# Patient Record
Sex: FEMALE | Race: WHITE | NOT HISPANIC OR LATINO | URBAN - METROPOLITAN AREA
[De-identification: names, ages, dates, MRNs, and addresses within clinical notes are randomized per-mention and may not be internally consistent; named-entity substitution may affect disease eponyms.]

---

## 2017-08-16 RX ORDER — FLUOXETINE HYDROCHLORIDE 20 MG/1
20 CAPSULE ORAL DAILY
Qty: 30 CAPSULE | Refills: 0 | Status: SHIPPED | OUTPATIENT
Start: 2017-08-16 | End: 2017-08-28

## 2017-08-28 ENCOUNTER — OFFICE VISIT (OUTPATIENT)
Dept: OBSTETRICS AND GYNECOLOGY | Age: 22
End: 2017-08-28

## 2017-08-28 VITALS
SYSTOLIC BLOOD PRESSURE: 108 MMHG | HEIGHT: 67 IN | BODY MASS INDEX: 40.81 KG/M2 | DIASTOLIC BLOOD PRESSURE: 74 MMHG | WEIGHT: 260 LBS

## 2017-08-28 DIAGNOSIS — Z12.4 ROUTINE CERVICAL SMEAR: ICD-10-CM

## 2017-08-28 DIAGNOSIS — Z01.419 ENCOUNTER FOR GYNECOLOGICAL EXAMINATION WITHOUT ABNORMAL FINDING: Primary | ICD-10-CM

## 2017-08-28 DIAGNOSIS — F41.9 ANXIETY: ICD-10-CM

## 2017-08-28 DIAGNOSIS — Z11.3 SCREEN FOR SEXUALLY TRANSMITTED DISEASES: ICD-10-CM

## 2017-08-28 DIAGNOSIS — E66.01 MORBID OBESITY DUE TO EXCESS CALORIES (HCC): ICD-10-CM

## 2017-08-28 PROBLEM — E66.9 OBESE: Status: ACTIVE | Noted: 2017-08-28

## 2017-08-28 PROCEDURE — 99395 PREV VISIT EST AGE 18-39: CPT | Performed by: OBSTETRICS & GYNECOLOGY

## 2017-08-28 RX ORDER — CITALOPRAM 20 MG/1
20 TABLET ORAL DAILY
Qty: 30 TABLET | Refills: 11 | Status: SHIPPED | OUTPATIENT
Start: 2017-08-28 | End: 2018-08-28

## 2017-08-28 RX ORDER — NORGESTREL AND ETHINYL ESTRADIOL 0.3-0.03MG
1 KIT ORAL DAILY
Qty: 28 TABLET | Refills: 11 | Status: SHIPPED | OUTPATIENT
Start: 2017-08-28 | End: 2017-08-28 | Stop reason: SDUPTHER

## 2017-08-28 RX ORDER — NORGESTREL AND ETHINYL ESTRADIOL 0.3-0.03MG
KIT ORAL
COMMUNITY
Start: 2017-07-10 | End: 2017-08-28 | Stop reason: SDUPTHER

## 2017-08-28 RX ORDER — EVENING PRIMROSE OIL 500 MG
500 CAPSULE ORAL DAILY
Qty: 30 EACH | Refills: 11 | Status: SHIPPED | OUTPATIENT
Start: 2017-08-28 | End: 2018-08-31 | Stop reason: SDUPTHER

## 2017-08-28 RX ORDER — NORGESTREL AND ETHINYL ESTRADIOL 0.3-0.03MG
1 KIT ORAL DAILY
Qty: 28 TABLET | Refills: 11 | Status: SHIPPED | OUTPATIENT
Start: 2017-08-28 | End: 2018-08-31 | Stop reason: SDUPTHER

## 2017-08-28 NOTE — PROGRESS NOTES
"Subjective     Chief Complaint   Patient presents with   • Gynecologic Exam     AC       History of Present Illness    Latisha Ruiz is a 21 y.o.  who presents for annual exam.  Started OCPS due to dysmenorrhea - happy taking OCPS   Labs last year including HGB AIC  and lipid were normal. Took prozac for anxiety - felt tired and emotional.  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   Her menses are regular every 28-30 days, lasting 4-7 days, dysmenorrhea mild, occurring first 1-2 days of flow   Obstetric History:  OB History      Para Term  AB TAB SAB Ectopic Multiple Living    0 0 0 0 0 0 0 0 0 0         Menstrual History:     Patient's last menstrual period was 2017.         Current contraception: OCP (estrogen/progesterone)  History of abnormal Pap smear: no  Received Gardasil immunization: yes - all 3  Perform regular self breast exam: no  Family history of uterine or ovarian cancer: no  Family History of colon cancer: no  Family history of breast cancer: no    Mammogram: not indicated.  Colonoscopy: not indicated.  DEXA: not indicated.    Exercise: exercises 7 times a week Gym   Calcium/Vitamin D: inadequate intake    The following portions of the patient's history were reviewed and updated as appropriate: allergies, current medications, past family history, past medical history, past social history, past surgical history and problem list.    Review of Systems    Review of Systems   Constitutional: Negative for fatigue.   Respiratory: Negative for shortness of breath.    Gastrointestinal: Negative for abdominal pain.   Genitourinary: Negative for dysuria.   Neurological: Negative for headaches.   Psychiatric/Behavioral: Anxiety        Objective   Physical Exam    /74  Ht 67\" (170.2 cm)  Wt 260 lb (118 kg)  LMP 2017  BMI 40.72 kg/m2    General:   alert, appears stated age and cooperative   Neck: thyroid normal to palpation   Heart: regular rate and rhythm   Lungs: clear to " auscultation bilaterally   Abdomen: soft, non-tender, without masses or organomegaly   Breast: inspection negative, no nipple discharge or bleeding, no masses or nodularity palpable   Vulva: normal, Bartholin's, Urethra, Dousman's normal   Vagina: normal mucosa, normal discharge   Cervix: no cervical motion tenderness and no lesions   Uterus: mobile, non-tender, normal shape and consistency   Adnexa: no mass, fullness, tenderness   Rectal: not indicated     Assessment/Plan   Latisha was seen today for gynecologic exam.    Diagnoses and all orders for this visit:    Encounter for gynecological examination without abnormal finding    Anxiety    Morbid obesity due to excess calories  -     Hemoglobin A1c  -     Lipid Panel  -     TSH    Other orders  -     Discontinue: LOW-OGESTREL 0.3-30 MG-MCG per tablet; Take 1 tablet by mouth Daily.  -     Evening Primrose Oil 500 MG capsule; Take 500 mg by mouth Daily.  -     citalopram (CELEXA) 20 MG tablet; Take 1 tablet by mouth Daily.  -     LOW-OGESTREL 0.3-30 MG-MCG per tablet; Take 1 tablet by mouth Daily.        All questions answered.  Breast self exam technique reviewed and patient encouraged to perform self-exam monthly.  Discussed healthy lifestyle modifications.  Recommended 30 minutes of aerobic exercise five times per week.  Discussed calcium needs to prevent osteoporosis.

## 2017-08-31 ENCOUNTER — TELEPHONE (OUTPATIENT)
Dept: OBSTETRICS AND GYNECOLOGY | Age: 22
End: 2017-08-31

## 2017-08-31 LAB
C TRACH RRNA CVX QL NAA+PROBE: NEGATIVE
CONV .: NORMAL
CYTOLOGIST CVX/VAG CYTO: NORMAL
CYTOLOGY CVX/VAG DOC THIN PREP: NORMAL
DX ICD CODE: NORMAL
HIV 1 & 2 AB SER-IMP: NORMAL
Lab: NORMAL
N GONORRHOEA RRNA CVX QL NAA+PROBE: NEGATIVE
OTHER STN SPEC: NORMAL
PATH REPORT.FINAL DX SPEC: NORMAL
STAT OF ADQ CVX/VAG CYTO-IMP: NORMAL

## 2017-09-01 LAB
CHOLEST SERPL-MCNC: 181 MG/DL (ref 0–200)
HBA1C MFR BLD: 5.6 % (ref 4.8–5.6)
HDLC SERPL-MCNC: 43 MG/DL (ref 40–60)
LDLC SERPL CALC-MCNC: 113 MG/DL (ref 0–100)
TRIGL SERPL-MCNC: 126 MG/DL (ref 0–150)
TSH SERPL DL<=0.005 MIU/L-ACNC: 3.72 MIU/ML (ref 0.27–4.2)
VLDLC SERPL CALC-MCNC: 25.2 MG/DL (ref 5–40)

## 2017-09-05 ENCOUNTER — TELEPHONE (OUTPATIENT)
Dept: OBSTETRICS AND GYNECOLOGY | Age: 22
End: 2017-09-05

## 2017-09-05 NOTE — TELEPHONE ENCOUNTER
----- Message from Matt Turk MD sent at 9/4/2017  4:37 PM EDT -----  Please notify labs are normal except for a slightly elevated LDL cholesterol at 113.  Normal is less than 100

## 2018-08-31 ENCOUNTER — OFFICE VISIT (OUTPATIENT)
Dept: OBSTETRICS AND GYNECOLOGY | Age: 23
End: 2018-08-31

## 2018-08-31 VITALS
SYSTOLIC BLOOD PRESSURE: 128 MMHG | HEIGHT: 67 IN | BODY MASS INDEX: 41.28 KG/M2 | DIASTOLIC BLOOD PRESSURE: 84 MMHG | WEIGHT: 263 LBS

## 2018-08-31 DIAGNOSIS — F41.9 ANXIETY: ICD-10-CM

## 2018-08-31 DIAGNOSIS — Z11.3 SCREENING EXAMINATION FOR VENEREAL DISEASE: ICD-10-CM

## 2018-08-31 DIAGNOSIS — E03.9 ACQUIRED HYPOTHYROIDISM: ICD-10-CM

## 2018-08-31 DIAGNOSIS — Z01.419 ENCOUNTER FOR GYNECOLOGICAL EXAMINATION WITHOUT ABNORMAL FINDING: Primary | ICD-10-CM

## 2018-08-31 PROCEDURE — 99395 PREV VISIT EST AGE 18-39: CPT | Performed by: OBSTETRICS & GYNECOLOGY

## 2018-08-31 RX ORDER — LEVOTHYROXINE SODIUM 0.15 MG/1
150 TABLET ORAL DAILY
COMMUNITY
End: 2018-11-15 | Stop reason: CLARIF

## 2018-08-31 RX ORDER — CITALOPRAM 20 MG/1
20 TABLET ORAL DAILY
Qty: 30 TABLET | Refills: 11 | Status: SHIPPED | OUTPATIENT
Start: 2018-08-31

## 2018-08-31 RX ORDER — EVENING PRIMROSE OIL 500 MG
500 CAPSULE ORAL DAILY
Qty: 30 EACH | Refills: 11 | Status: SHIPPED | OUTPATIENT
Start: 2018-08-31 | End: 2021-03-18

## 2018-08-31 RX ORDER — NORGESTREL AND ETHINYL ESTRADIOL 0.3-0.03MG
1 KIT ORAL DAILY
Qty: 28 TABLET | Refills: 11 | Status: SHIPPED | OUTPATIENT
Start: 2018-08-31 | End: 2021-03-18 | Stop reason: SDUPTHER

## 2018-08-31 RX ORDER — CITALOPRAM 20 MG/1
20 TABLET ORAL DAILY
COMMUNITY
End: 2018-08-31 | Stop reason: SDUPTHER

## 2018-09-01 LAB
CHOLEST SERPL-MCNC: 184 MG/DL (ref 0–200)
HDLC SERPL-MCNC: 45 MG/DL (ref 40–60)
LDLC SERPL CALC-MCNC: 120 MG/DL (ref 0–100)
T4 FREE SERPL-MCNC: 1.97 NG/DL (ref 0.93–1.7)
TRIGL SERPL-MCNC: 95 MG/DL (ref 0–150)
TSH SERPL DL<=0.005 MIU/L-ACNC: 0.54 MIU/ML (ref 0.27–4.2)
VLDLC SERPL CALC-MCNC: 19 MG/DL (ref 5–40)

## 2018-09-04 LAB
C TRACH RRNA SPEC QL NAA+PROBE: NEGATIVE
N GONORRHOEA RRNA SPEC QL NAA+PROBE: NEGATIVE

## 2018-09-10 ENCOUNTER — TELEPHONE (OUTPATIENT)
Dept: OBSTETRICS AND GYNECOLOGY | Age: 23
End: 2018-09-10

## 2018-09-10 NOTE — TELEPHONE ENCOUNTER
----- Message from Matt Turk MD sent at 9/10/2018  3:17 PM EDT -----  Please notify that free T4 is elevated.  The patient's dose of thyroid  medicine needs to be reduced.  I would recommend reduction of dose to 100 µg daily with recheck of TSH and free T4 in 6 weeks.

## 2018-09-11 RX ORDER — LEVOTHYROXINE SODIUM 0.1 MG/1
100 TABLET ORAL DAILY
Qty: 30 TABLET | Refills: 1 | Status: SHIPPED | OUTPATIENT
Start: 2018-09-11 | End: 2018-11-15

## 2018-11-15 ENCOUNTER — OFFICE VISIT (OUTPATIENT)
Dept: ENDOCRINOLOGY | Age: 23
End: 2018-11-15

## 2018-11-15 VITALS
HEIGHT: 67 IN | SYSTOLIC BLOOD PRESSURE: 110 MMHG | HEART RATE: 97 BPM | BODY MASS INDEX: 42.22 KG/M2 | WEIGHT: 269 LBS | DIASTOLIC BLOOD PRESSURE: 70 MMHG | OXYGEN SATURATION: 97 %

## 2018-11-15 DIAGNOSIS — E03.9 ACQUIRED HYPOTHYROIDISM: Primary | ICD-10-CM

## 2018-11-15 DIAGNOSIS — E55.9 VITAMIN D DEFICIENCY: ICD-10-CM

## 2018-11-15 PROCEDURE — 99204 OFFICE O/P NEW MOD 45 MIN: CPT | Performed by: INTERNAL MEDICINE

## 2018-11-15 RX ORDER — LEVOTHYROXINE SODIUM 88 UG/1
88 TABLET ORAL DAILY
Qty: 30 TABLET | Refills: 3 | Status: SHIPPED | OUTPATIENT
Start: 2018-11-15 | End: 2019-04-01 | Stop reason: SDUPTHER

## 2018-11-15 NOTE — PROGRESS NOTES
Chief Complaint   Patient presents with   • Hypothyroidism   NEW PATIENT APPOINTMENT/ HYPOTHYROIDISM    [facility] 23 y.o. WF presents as a new patient for the evaluation of Hypothyroidism. Consulted by Dr. Turk.   Pt was told that her thyroid levels were abnormal by her NP when she had concerns of not being able to lose to weight.   She was started on levothyroxine 150 mcg oral daily - ( her dose was adjusted from 25 --> 110 --> 150 --> 100 mcg and then she stopped as she ran out of the medication today)  On the medication she felt better in her symptoms of weight gain, energy levels.     Energy are better since being in thyroid medication, she still cannot lose weight, she tore her ACL and her activity was limited since then, gained about 100 pounds in the last 2 years, she tries exercise at least 4 - 5 times in gym, she doesn't count her calories, normal BM, no hair loss, no increased sweating, no dry skin, sleep is good, she does snore at times. No c/o heat intolerance and no cold intolerance. c/o tremors, racing of heart and no eye symptoms.   Denied c/o difficulty breathing, swallowing and change in voice.   Family hx of thyroid disease in her aunt.     Menarche at age 11 -12, periods are regular for most part, on birth control pills for cramping, LMP - last week. No miscarriages and never been pregnant.     Reviewed primary care physician's/consulting physician documentation and lab results :     I have reviewed the patient's allergies, medicines, past medical hx, family hx and social hx in detail.    Past Medical History:   Diagnosis Date   • Anxiety        Family History   Problem Relation Age of Onset   • Esophageal cancer Maternal Grandfather    • Hypertension Father    • Heart attack Paternal Grandfather    • Diabetes Paternal Grandfather        Social History     Socioeconomic History   • Marital status: Unknown     Spouse name: Not on file   • Number of children: Not on file   • Years of education:  "Not on file   • Highest education level: Not on file   Social Needs   • Financial resource strain: Not on file   • Food insecurity - worry: Not on file   • Food insecurity - inability: Not on file   • Transportation needs - medical: Not on file   • Transportation needs - non-medical: Not on file   Occupational History   • Occupation:  student and plans nursing school     Comment: [employer]    Tobacco Use   • Smoking status: Never Smoker   Substance and Sexual Activity   • Alcohol use: Not on file     Comment: rare   • Drug use: No   • Sexual activity: No   Other Topics Concern   • Not on file   Social History Narrative   • Not on file       No Known Allergies      Current Outpatient Medications:   •  citalopram (CeleXA) 20 MG tablet, Take 1 tablet by mouth Daily., Disp: 30 tablet, Rfl: 11  •  Evening Primrose Oil 500 MG capsule, Take 500 mg by mouth Daily., Disp: 30 each, Rfl: 11  •  levothyroxine (SYNTHROID, LEVOTHROID) 88 MCG tablet, Take 1 tablet by mouth Daily., Disp: 30 tablet, Rfl: 3  •  LOW-OGESTREL 0.3-30 MG-MCG per tablet, Take 1 tablet by mouth Daily., Disp: 28 tablet, Rfl: 11     Review of Systems   Constitutional: Negative for appetite change, fatigue and fever.   Eyes: Negative for visual disturbance.   Respiratory: Negative for shortness of breath.    Cardiovascular: Negative for palpitations and leg swelling.   Gastrointestinal: Negative for abdominal pain and vomiting.   Endocrine: Negative for polydipsia and polyuria.   Musculoskeletal: Negative for joint swelling and neck pain.   Skin: Negative for rash.   Neurological: Negative for weakness and numbness.   Psychiatric/Behavioral: Negative for behavioral problems.       Objective:    /70   Pulse 97   Ht 170.2 cm (67\")   Wt 122 kg (269 lb)   SpO2 97%   BMI 42.13 kg/m²     Physical Exam   Constitutional: She is oriented to person, place, and time. She appears well-nourished.   HENT:   Head: Normocephalic and atraumatic.   Eyes: Conjunctivae " and EOM are normal. No scleral icterus.   Neck: Normal range of motion. Neck supple. No thyromegaly present.   Cardiovascular: Normal rate and normal heart sounds. Exam reveals no friction rub.   No murmur heard.  Pulmonary/Chest: Effort normal and breath sounds normal. No stridor. She has no wheezes. She has no rales.   Abdominal: Soft. Bowel sounds are normal. She exhibits no distension. There is no tenderness.   Obese     Musculoskeletal: She exhibits no edema or tenderness.   Lymphadenopathy:     She has no cervical adenopathy.   Neurological: She is alert and oriented to person, place, and time.   Skin: Skin is warm and dry. She is not diaphoretic.   Psychiatric: She has a normal mood and affect.   Vitals reviewed.      Results Review:    I reviewed the patient's new clinical results.    Office Visit on 08/31/2018   Component Date Value Ref Range Status   • TSH 08/31/2018 0.540  0.270 - 4.200 mIU/mL Final   • Free T4 08/31/2018 1.97* 0.93 - 1.70 ng/dL Final   • Chlamydia trachomatis, EBONIE 08/31/2018 Negative  Negative Final   • Neisseria gonorrhoeae, EBONIE 08/31/2018 Negative  Negative Final   • Total Cholesterol 08/31/2018 184  0 - 200 mg/dL Final   • Triglycerides 08/31/2018 95  0 - 150 mg/dL Final   • HDL Cholesterol 08/31/2018 45  40 - 60 mg/dL Final   • VLDL Cholesterol 08/31/2018 19  5 - 40 mg/dL Final   • LDL Cholesterol  08/31/2018 120* 0 - 100 mg/dL Final         Latisha was seen today for hypothyroidism.    Diagnoses and all orders for this visit:    Acquired hypothyroidism  -     TSH; Future  -     T4, Free; Future  -     Vitamin D 25 Hydroxy; Future  -     TSH; Future  -     T4, Free; Future  -     Comprehensive Metabolic Panel; Future  -     Lipid Panel; Future  -     Vitamin B12 & Folate; Future  -     Vitamin D 25 Hydroxy; Future    Vitamin D deficiency  -     TSH; Future  -     T4, Free; Future  -     Vitamin D 25 Hydroxy; Future  -     TSH; Future  -     T4, Free; Future  -     Comprehensive  "Metabolic Panel; Future  -     Lipid Panel; Future  -     Vitamin B12 & Folate; Future  -     Vitamin D 25 Hydroxy; Future    Other orders  -     levothyroxine (SYNTHROID, LEVOTHROID) 88 MCG tablet; Take 1 tablet by mouth Daily.    Hypothyroidism-levels are not controlled  Will decrease the dosage of levothyroxin to 88 µg oral daily  Will repeat the thyroid function tests in the next 6 weeks  Advised the patient to take the medication on empty stomach every single day.    Morbid obesity  Discussed with the patient about calorie counting next and also discussed about exercise regimen.  Discussed with the patient about metabolic training programs.    Vitamin D deficiency  Will check vitamin D levels.      Thank you for asking me to see your patient, Latisha Ruiz in consultation.        Jaky Allen MD  11/15/18    EMR Dragon / transcription disclaimer:     \"Dictated utilizing Dragon dictation\".          "

## 2018-12-27 ENCOUNTER — RESULTS ENCOUNTER (OUTPATIENT)
Dept: ENDOCRINOLOGY | Age: 23
End: 2018-12-27

## 2018-12-27 DIAGNOSIS — E55.9 VITAMIN D DEFICIENCY: ICD-10-CM

## 2018-12-27 DIAGNOSIS — E03.9 ACQUIRED HYPOTHYROIDISM: ICD-10-CM

## 2018-12-28 LAB
25(OH)D3+25(OH)D2 SERPL-MCNC: 16.8 NG/ML (ref 30–100)
T4 FREE SERPL-MCNC: 1.35 NG/DL (ref 0.93–1.7)
TSH SERPL DL<=0.005 MIU/L-ACNC: 2.1 MIU/ML (ref 0.27–4.2)

## 2018-12-31 RX ORDER — ERGOCALCIFEROL 1.25 MG/1
50000 CAPSULE ORAL 2 TIMES WEEKLY
Qty: 30 CAPSULE | Refills: 11 | Status: SHIPPED | OUTPATIENT
Start: 2018-12-31 | End: 2019-09-21 | Stop reason: SDUPTHER

## 2019-04-01 RX ORDER — LEVOTHYROXINE SODIUM 88 UG/1
TABLET ORAL
Qty: 30 TABLET | Refills: 2 | Status: SHIPPED | OUTPATIENT
Start: 2019-04-01 | End: 2019-08-05 | Stop reason: SDUPTHER

## 2019-05-03 ENCOUNTER — LAB (OUTPATIENT)
Dept: ENDOCRINOLOGY | Age: 24
End: 2019-05-03

## 2019-05-03 DIAGNOSIS — E03.9 ACQUIRED HYPOTHYROIDISM: ICD-10-CM

## 2019-05-03 DIAGNOSIS — E55.9 VITAMIN D DEFICIENCY: ICD-10-CM

## 2019-05-04 LAB
25(OH)D3+25(OH)D2 SERPL-MCNC: 26.5 NG/ML (ref 30–100)
ALBUMIN SERPL-MCNC: 4 G/DL (ref 3.5–5.2)
ALBUMIN/GLOB SERPL: 1.4 G/DL
ALP SERPL-CCNC: 43 U/L (ref 39–117)
ALT SERPL-CCNC: 20 U/L (ref 1–33)
AST SERPL-CCNC: 12 U/L (ref 1–32)
BILIRUB SERPL-MCNC: 0.2 MG/DL (ref 0.2–1.2)
BUN SERPL-MCNC: 11 MG/DL (ref 6–20)
BUN/CREAT SERPL: 15.1 (ref 7–25)
CALCIUM SERPL-MCNC: 9.8 MG/DL (ref 8.6–10.5)
CHLORIDE SERPL-SCNC: 104 MMOL/L (ref 98–107)
CHOLEST SERPL-MCNC: 187 MG/DL (ref 0–200)
CO2 SERPL-SCNC: 25.9 MMOL/L (ref 22–29)
CREAT SERPL-MCNC: 0.73 MG/DL (ref 0.57–1)
FOLATE SERPL-MCNC: 9.88 NG/ML (ref 4.78–24.2)
GLOBULIN SER CALC-MCNC: 2.9 GM/DL
GLUCOSE SERPL-MCNC: 89 MG/DL (ref 65–99)
HDLC SERPL-MCNC: 42 MG/DL (ref 40–60)
INTERPRETATION: NORMAL
LDLC SERPL CALC-MCNC: 131 MG/DL (ref 0–100)
POTASSIUM SERPL-SCNC: 4.7 MMOL/L (ref 3.5–5.2)
PROT SERPL-MCNC: 6.9 G/DL (ref 6–8.5)
SODIUM SERPL-SCNC: 141 MMOL/L (ref 136–145)
T4 FREE SERPL-MCNC: 1.2 NG/DL (ref 0.93–1.7)
TRIGL SERPL-MCNC: 68 MG/DL (ref 0–150)
TSH SERPL DL<=0.005 MIU/L-ACNC: 2.8 MIU/ML (ref 0.27–4.2)
VIT B12 SERPL-MCNC: 553 PG/ML (ref 211–946)
VLDLC SERPL CALC-MCNC: 13.6 MG/DL

## 2019-05-17 ENCOUNTER — OFFICE VISIT (OUTPATIENT)
Dept: ENDOCRINOLOGY | Age: 24
End: 2019-05-17

## 2019-05-17 VITALS
SYSTOLIC BLOOD PRESSURE: 112 MMHG | DIASTOLIC BLOOD PRESSURE: 80 MMHG | HEART RATE: 97 BPM | HEIGHT: 67 IN | WEIGHT: 274 LBS | BODY MASS INDEX: 43 KG/M2 | OXYGEN SATURATION: 98 %

## 2019-05-17 DIAGNOSIS — E55.9 VITAMIN D DEFICIENCY: ICD-10-CM

## 2019-05-17 DIAGNOSIS — E03.9 ACQUIRED HYPOTHYROIDISM: ICD-10-CM

## 2019-05-17 DIAGNOSIS — E66.01 CLASS 3 SEVERE OBESITY DUE TO EXCESS CALORIES WITHOUT SERIOUS COMORBIDITY WITH BODY MASS INDEX (BMI) OF 40.0 TO 44.9 IN ADULT (HCC): Primary | ICD-10-CM

## 2019-05-17 PROCEDURE — 99214 OFFICE O/P EST MOD 30 MIN: CPT | Performed by: INTERNAL MEDICINE

## 2019-05-17 NOTE — PROGRESS NOTES
23 y.o.    Patient Care Team:  Geraldine Craig DO as PCP - General (Internal Medicine)    Chief Complaint:    6 MONTH FOLLOW UP/ HYPOTHYROIDISM  Subjective     HPI    Latisha Joseph 23 y.o. WF presents as a F/u patient for the evaluation of Hypothyroidism. Consulted by Dr. Turk.     Today in the clinic patient reports that she is on levothyroxine 88 mcg oral daily.  She is compliant with the medication.  Takes the medication every single day on empty stomach.    She does report that her energy levels are good, she is extremely concerned of her inability to lose the weight, she exercises at least 4-5 times in urgent but she is still not able to lose the weight, she is not very diet conscious and does not count her calories or restrict her diet, reports normal bowel movements, no hair loss, no increased sweating or dry skin, sleep is good.  She does snore in her sleep.  Normal temperature preference.  No tremors, racing of heart or eye symptoms.  No shortness of breath, issues in swallowing or change in voice.  Family hx of thyroid disease in her aunt.      Menarche at age 11 -12, periods are regular for most part, on birth control pills, LMP in April 2019. No miscarriages and never been pregnant.     Obesity  Patient does have family history of obesity in her uncle but her dad and siblings are related bleeding.  She wants to lose about  pounds.  Has not been on any weight loss medications before.  No cardiac issues, blood pressure or kidney stones.    Reviewed primary care physician's/consulting physician documentation and lab results :     Interval History      The following portions of the patient's history were reviewed and updated as appropriate: allergies, current medications, past family history, past medical history, past social history, past surgical history and problem list.    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     Comment: [employer]    Tobacco Use   • Smoking status: Never Smoker   Substance and Sexual Activity   • Drug use: No   • Sexual activity: No     No Known Allergies    Current Outpatient Medications:   •  citalopram (CeleXA) 20 MG tablet, Take 1 tablet by mouth Daily., Disp: 30 tablet, Rfl: 11  •  Evening Primrose Oil 500 MG capsule, Take 500 mg by mouth Daily., Disp: 30 each, Rfl: 11  •  levothyroxine (SYNTHROID, LEVOTHROID) 88 MCG tablet, TAKE ONE TABLET BY MOUTH DAILY, Disp: 30 tablet, Rfl: 2  •  LOW-OGESTREL 0.3-30 MG-MCG per tablet, Take 1 tablet by mouth Daily., Disp: 28 tablet, Rfl: 11  •  vitamin D (ERGOCALCIFEROL) 63815 units capsule capsule, Take 1 capsule by mouth 2 (Two) Times a Week., Disp: 30 capsule, Rfl: 11        Review of Systems   Constitutional: Negative for appetite change, fatigue and fever.   Eyes: Negative for visual disturbance.   Respiratory: Negative for shortness of breath.    Cardiovascular: Negative for palpitations and leg swelling.   Gastrointestinal: Negative for abdominal pain and vomiting.   Endocrine: Negative for polydipsia and polyuria.   Musculoskeletal: Negative for joint swelling and neck pain.   Skin: Negative for rash.   Neurological: Negative for weakness and numbness.   Psychiatric/Behavioral: Negative for behavioral problems.       Objective       Vitals:    05/17/19 0946   BP: 112/80   Pulse: 97   SpO2: 98%   Weight: 124 kg (274 lb)   Height: 170.2 cm (67\")     Body mass index is 42.91 kg/m².      Physical Exam   Constitutional: She is oriented to person, place, and " time. She appears well-nourished.   Obese     HENT:   Head: Normocephalic and atraumatic.   Wide neck   Eyes: Conjunctivae and EOM are normal. No scleral icterus.   Neck: Normal range of motion. Neck supple. No thyromegaly present.   Cardiovascular: Normal rate and normal heart sounds.   Pulmonary/Chest: Effort normal and breath sounds normal. No stridor. She has no wheezes.   Abdominal: Soft. Bowel sounds are normal. She exhibits no distension. There is no tenderness.   Central obesity   Musculoskeletal: She exhibits no edema or tenderness.   Neurological: She is alert and oriented to person, place, and time.   Skin: Skin is warm and dry. She is not diaphoretic.   Psychiatric: She has a normal mood and affect.   Vitals reviewed.    Results Review:     I reviewed the patient's new clinical results and mentioned them above in HPI and in plan as well.    Medical records reviewed  Summary:done      Lab on 05/03/2019   Component Date Value Ref Range Status   • TSH 05/03/2019 2.800  0.270 - 4.200 mIU/mL Final   • Free T4 05/03/2019 1.20  0.93 - 1.70 ng/dL Final   • Glucose 05/03/2019 89  65 - 99 mg/dL Final   • BUN 05/03/2019 11  6 - 20 mg/dL Final   • Creatinine 05/03/2019 0.73  0.57 - 1.00 mg/dL Final   • eGFR Non African Am 05/03/2019 99  >60 mL/min/1.73 Final   • eGFR African Am 05/03/2019 120  >60 mL/min/1.73 Final   • BUN/Creatinine Ratio 05/03/2019 15.1  7.0 - 25.0 Final   • Sodium 05/03/2019 141  136 - 145 mmol/L Final   • Potassium 05/03/2019 4.7  3.5 - 5.2 mmol/L Final   • Chloride 05/03/2019 104  98 - 107 mmol/L Final   • Total CO2 05/03/2019 25.9  22.0 - 29.0 mmol/L Final   • Calcium 05/03/2019 9.8  8.6 - 10.5 mg/dL Final   • Total Protein 05/03/2019 6.9  6.0 - 8.5 g/dL Final   • Albumin 05/03/2019 4.00  3.50 - 5.20 g/dL Final   • Globulin 05/03/2019 2.9  gm/dL Final   • A/G Ratio 05/03/2019 1.4  g/dL Final   • Total Bilirubin 05/03/2019 0.2  0.2 - 1.2 mg/dL Final   • Alkaline Phosphatase 05/03/2019 43  39 -  117 U/L Final   • AST (SGOT) 05/03/2019 12  1 - 32 U/L Final   • ALT (SGPT) 05/03/2019 20  1 - 33 U/L Final   • Total Cholesterol 05/03/2019 187  0 - 200 mg/dL Final   • Triglycerides 05/03/2019 68  0 - 150 mg/dL Final   • HDL Cholesterol 05/03/2019 42  40 - 60 mg/dL Final   • VLDL Cholesterol 05/03/2019 13.6  mg/dL Final   • LDL Cholesterol  05/03/2019 131* 0 - 100 mg/dL Final   • Vitamin B-12 05/03/2019 553  211 - 946 pg/mL Final   • Folate 05/03/2019 9.88  4.78 - 24.20 ng/mL Final   • 25 Hydroxy, Vitamin D 05/03/2019 26.5* 30.0 - 100.0 ng/ml Final    Comment: Reference Range for Total Vitamin D 25(OH)  Deficiency <20.0 ng/mL  Insufficiency 21-29 ng/mL  Sufficiency  ng/mL  Toxicity >100 ng/ml     • Interpretation 05/03/2019 Note   Final    Supplemental report is available.     Lab Results   Component Value Date    HGBA1C 5.60 09/01/2017    HGBA1C 5.5 08/12/2016     Lab Results   Component Value Date    CREATININE 0.73 05/03/2019     Imaging Results (most recent)     None                Assessment and Plan:    Latisha was seen today for hypothyroidism.    Diagnoses and all orders for this visit:    Class 3 severe obesity due to excess calories without serious comorbidity with body mass index (BMI) of 40.0 to 44.9 in adult (CMS/[facility])  -     TSH; Future  -     T4, Free; Future  -     Comprehensive Metabolic Panel; Future  -     Vitamin B12 & Folate; Future  -     Vitamin D 25 Hydroxy; Future    Acquired hypothyroidism  -     TSH; Future  -     T4, Free; Future  -     Comprehensive Metabolic Panel; Future  -     Vitamin B12 & Folate; Future  -     Vitamin D 25 Hydroxy; Future    Vitamin D deficiency  -     TSH; Future  -     T4, Free; Future  -     Comprehensive Metabolic Panel; Future  -     Vitamin B12 & Folate; Future  -     Vitamin D 25 Hydroxy; Future      Obesity-worse  Discussed the weight loss options with the patient  Discussed various weight loss medications, the risks and side effects  Gave all the  "brochures to the patient.  Discussed with the patient options of bariatric surgery.  Patient would want to proceed with the weight loss medication, she will call us next week to let us know which medication she would like to proceed with.    Hypo thyroidism  Continue levothyroxine 88 mcg oral daily  We will check thyroid panel.    Vitamin D deficiency  Continue vitamin D replacement.    Reviewed Lab results with the patient.             Jaky Allen MD  05/17/19    EMR Dragon / transcription disclaimer:     \"Dictated utilizing Dragon dictation\".  "

## 2019-07-09 ENCOUNTER — TELEPHONE (OUTPATIENT)
Dept: ENDOCRINOLOGY | Age: 24
End: 2019-07-09

## 2019-08-06 RX ORDER — LEVOTHYROXINE SODIUM 88 UG/1
TABLET ORAL
Qty: 30 TABLET | Refills: 1 | Status: SHIPPED | OUTPATIENT
Start: 2019-08-06 | End: 2019-09-21 | Stop reason: SDUPTHER

## 2019-08-07 ENCOUNTER — TELEPHONE (OUTPATIENT)
Dept: ENDOCRINOLOGY | Age: 24
End: 2019-08-07

## 2019-08-07 NOTE — TELEPHONE ENCOUNTER
Phentermine-Topiramate (QSYMIA) 7.5-46 MG capsule sustained-release 24 hr   TAKE ONE CAPSULE BY MOUTH DAILY   Dispense: 30 capsule     Refills: 0         PRESCRIPTION SENT TO PHARMACY

## 2019-08-08 ENCOUNTER — LAB (OUTPATIENT)
Dept: ENDOCRINOLOGY | Age: 24
End: 2019-08-08

## 2019-08-08 DIAGNOSIS — E03.9 ACQUIRED HYPOTHYROIDISM: ICD-10-CM

## 2019-08-08 DIAGNOSIS — E55.9 VITAMIN D DEFICIENCY: ICD-10-CM

## 2019-08-08 DIAGNOSIS — E66.01 CLASS 3 SEVERE OBESITY DUE TO EXCESS CALORIES WITHOUT SERIOUS COMORBIDITY WITH BODY MASS INDEX (BMI) OF 40.0 TO 44.9 IN ADULT (HCC): ICD-10-CM

## 2019-08-09 LAB
25(OH)D3+25(OH)D2 SERPL-MCNC: 27 NG/ML (ref 30–100)
ALBUMIN SERPL-MCNC: 3.8 G/DL (ref 3.5–5.2)
ALBUMIN/GLOB SERPL: 1.2 G/DL
ALP SERPL-CCNC: 56 U/L (ref 39–117)
ALT SERPL-CCNC: 15 U/L (ref 1–33)
AST SERPL-CCNC: 15 U/L (ref 1–32)
BILIRUB SERPL-MCNC: <0.2 MG/DL (ref 0.2–1.2)
BUN SERPL-MCNC: 10 MG/DL (ref 6–20)
BUN/CREAT SERPL: 11.6 (ref 7–25)
CALCIUM SERPL-MCNC: 9.3 MG/DL (ref 8.6–10.5)
CHLORIDE SERPL-SCNC: 103 MMOL/L (ref 98–107)
CO2 SERPL-SCNC: 24.5 MMOL/L (ref 22–29)
CREAT SERPL-MCNC: 0.86 MG/DL (ref 0.57–1)
FOLATE SERPL-MCNC: 12.8 NG/ML (ref 4.78–24.2)
GLOBULIN SER CALC-MCNC: 3.1 GM/DL
GLUCOSE SERPL-MCNC: 95 MG/DL (ref 65–99)
POTASSIUM SERPL-SCNC: 4.3 MMOL/L (ref 3.5–5.2)
PROT SERPL-MCNC: 6.9 G/DL (ref 6–8.5)
SODIUM SERPL-SCNC: 141 MMOL/L (ref 136–145)
T4 FREE SERPL-MCNC: 1.27 NG/DL (ref 0.93–1.7)
TSH SERPL DL<=0.005 MIU/L-ACNC: 3.89 MIU/ML (ref 0.27–4.2)
VIT B12 SERPL-MCNC: 544 PG/ML (ref 211–946)

## 2019-08-22 ENCOUNTER — OFFICE VISIT (OUTPATIENT)
Dept: ENDOCRINOLOGY | Age: 24
End: 2019-08-22

## 2019-08-22 VITALS
HEIGHT: 68 IN | HEART RATE: 87 BPM | OXYGEN SATURATION: 98 % | DIASTOLIC BLOOD PRESSURE: 66 MMHG | BODY MASS INDEX: 41.37 KG/M2 | WEIGHT: 273 LBS | SYSTOLIC BLOOD PRESSURE: 126 MMHG

## 2019-08-22 DIAGNOSIS — E03.9 ACQUIRED HYPOTHYROIDISM: Primary | ICD-10-CM

## 2019-08-22 DIAGNOSIS — E66.01 CLASS 3 SEVERE OBESITY DUE TO EXCESS CALORIES WITHOUT SERIOUS COMORBIDITY WITH BODY MASS INDEX (BMI) OF 40.0 TO 44.9 IN ADULT (HCC): ICD-10-CM

## 2019-08-22 DIAGNOSIS — E55.9 VITAMIN D DEFICIENCY: ICD-10-CM

## 2019-08-22 PROCEDURE — 99214 OFFICE O/P EST MOD 30 MIN: CPT | Performed by: INTERNAL MEDICINE

## 2019-08-22 NOTE — PROGRESS NOTES
23 y.o.    Patient Care Team:  Geraldine Craig DO as PCP - General (Internal Medicine)    Chief Complaint:    FOLLOW UP/ HYPOTHYROIDISM  Subjective     HPI    Latisha Ruiz 23 y.o. WF presents as a F/u patient for the evaluation of Hypothyroidism. Consulted by Dr. Turk.       Today in the clinic patient reports that she is on levothyroxine 88 mcg oral daily.  Compliant with her medication and takes it every single day on empty stomach.    She does report that her energy levels are good, her weight has been stable, no hypo-or hyperthyroid symptoms.  Family history of thyroid disease in her aunt.  Menarche at age 11 -12, periods are regular for most part, on birth control pills, last menstrual cycle was July 2019. No miscarriages and never been pregnant.      Obesity  Patient does have family history of obesity in her uncle but her dad and siblings are thin. She wants to lose about  pounds.  During last visit patient was started on Qsymia, today in the clinic she reports that she is on Qsymia 7.5-46 mg oral daily.  She has been tolerating the medication with no racing of heart, elevated blood pressure etc.  However the patient lost 10 pounds of weight she gained all the way back when she started school as she was not able to follow her routine and stick to her dietary plan.    Reviewed primary care physician's/consulting physician documentation and lab results :     Interval History      The following portions of the patient's history were reviewed and updated as appropriate: allergies, current medications, past family history, past medical history, past social history, past surgical history and problem list.    Past Medical History:   Diagnosis Date   • Anxiety      Family History   Problem Relation Age of Onset   • Esophageal cancer Maternal Grandfather    • Hypertension Father    • Heart attack Paternal Grandfather    • Diabetes Paternal Grandfather      Social History     Socioeconomic History   • Marital  "status: Unknown     Spouse name: Not on file   • Number of children: Not on file   • Years of education: Not on file   • Highest education level: Not on file   Occupational History   • Occupation: [employer] student and [employer]     Comment: [employer]    Tobacco Use   • Smoking status: Never Smoker   Substance and Sexual Activity   • Drug use: No   • Sexual activity: No     No Known Allergies    Current Outpatient Medications:   •  citalopram (CeleXA) 20 MG tablet, Take 1 tablet by mouth Daily., Disp: 30 tablet, Rfl: 11  •  Evening Primrose Oil 500 MG capsule, Take 500 mg by mouth Daily., Disp: 30 each, Rfl: 11  •  levothyroxine (SYNTHROID, LEVOTHROID) 88 MCG tablet, TAKE ONE TABLET BY MOUTH DAILY, Disp: 30 tablet, Rfl: 1  •  LOW-OGESTREL 0.3-30 MG-MCG per tablet, Take 1 tablet by mouth Daily., Disp: 28 tablet, Rfl: 11  •  vitamin D (ERGOCALCIFEROL) 44677 units capsule capsule, Take 1 capsule by mouth 2 (Two) Times a Week., Disp: 30 capsule, Rfl: 11        Review of Systems   Constitutional: Negative for appetite change, fatigue and fever.   Eyes: Negative for visual disturbance.   Respiratory: Negative for shortness of breath.    Cardiovascular: Negative for palpitations and leg swelling.   Gastrointestinal: Negative for abdominal pain and vomiting.   Endocrine: Negative for polydipsia and polyuria.   Musculoskeletal: Negative for joint swelling and neck pain.   Skin: Negative for rash.   Neurological: Negative for weakness and numbness.   Psychiatric/Behavioral: Negative for behavioral problems.       Objective       Vitals:    08/22/19 1229   BP: 126/66   Pulse: 87   SpO2: 98%   Weight: 124 kg (273 lb)   Height: 172.7 cm (68\")     Body mass index is 41.51 kg/m².      Physical Exam   Constitutional: She is oriented to person, place, and time. She appears well-nourished.   Obese     HENT:   Head: Normocephalic and atraumatic.   Wide neck   Eyes: Conjunctivae and EOM are normal. No scleral icterus.   Neck: Normal " range of motion. Neck supple. No thyromegaly present.   Cardiovascular: Normal rate and normal heart sounds.   Pulmonary/Chest: Effort normal and breath sounds normal. No stridor. She has no wheezes.   Abdominal: Soft. Bowel sounds are normal. She exhibits no distension. There is no tenderness.   Central obesity   Musculoskeletal: She exhibits no edema or tenderness.   Neurological: She is alert and oriented to person, place, and time.   Skin: Skin is warm and dry. She is not diaphoretic.   Psychiatric: She has a normal mood and affect.   Vitals reviewed.    Results Review:     I reviewed the patient's new clinical results and mentioned them above in HPI and in plan as well.    Medical records reviewed  Summary:done      Lab on 08/08/2019   Component Date Value Ref Range Status   • 25 Hydroxy, Vitamin D 08/08/2019 27.0* 30.0 - 100.0 ng/ml Final    Comment: Reference Range for Total Vitamin D 25(OH)  Deficiency <20.0 ng/mL  Insufficiency 21-29 ng/mL  Sufficiency  ng/mL  Toxicity >100 ng/ml     • Vitamin B-12 08/08/2019 544  211 - 946 pg/mL Final   • Folate 08/08/2019 12.80  4.78 - 24.20 ng/mL Final   • Glucose 08/08/2019 95  65 - 99 mg/dL Final   • BUN 08/08/2019 10  6 - 20 mg/dL Final   • Creatinine 08/08/2019 0.86  0.57 - 1.00 mg/dL Final   • eGFR Non  Am 08/08/2019 82  >60 mL/min/1.73 Final   • eGFR African Am 08/08/2019 99  >60 mL/min/1.73 Final   • BUN/Creatinine Ratio 08/08/2019 11.6  7.0 - 25.0 Final   • Sodium 08/08/2019 141  136 - 145 mmol/L Final   • Potassium 08/08/2019 4.3  3.5 - 5.2 mmol/L Final   • Chloride 08/08/2019 103  98 - 107 mmol/L Final   • Total CO2 08/08/2019 24.5  22.0 - 29.0 mmol/L Final   • Calcium 08/08/2019 9.3  8.6 - 10.5 mg/dL Final   • Total Protein 08/08/2019 6.9  6.0 - 8.5 g/dL Final   • Albumin 08/08/2019 3.80  3.50 - 5.20 g/dL Final   • Globulin 08/08/2019 3.1  gm/dL Final   • A/G Ratio 08/08/2019 1.2  g/dL Final   • Total Bilirubin 08/08/2019 <0.2* 0.2 - 1.2 mg/dL  Final   • Alkaline Phosphatase 08/08/2019 56  39 - 117 U/L Final   • AST (SGOT) 08/08/2019 15  1 - 32 U/L Final   • ALT (SGPT) 08/08/2019 15  1 - 33 U/L Final   • Free T4 08/08/2019 1.27  0.93 - 1.70 ng/dL Final   • TSH 08/08/2019 3.890  0.270 - 4.200 mIU/mL Final     Lab Results   Component Value Date    HGBA1C 5.60 09/01/2017    HGBA1C 5.5 08/12/2016     Lab Results   Component Value Date    CREATININE 0.86 08/08/2019     Imaging Results (most recent)     None                Assessment and Plan:    Diagnoses and all orders for this visit:    Acquired hypothyroidism  -     TSH; Future  -     T4, Free; Future  -     Comprehensive Metabolic Panel; Future  -     Vitamin D 25 Hydroxy; Future  -     Vitamin B12 & Folate; Future    Vitamin D deficiency  -     TSH; Future  -     T4, Free; Future  -     Comprehensive Metabolic Panel; Future  -     Vitamin D 25 Hydroxy; Future  -     Vitamin B12 & Folate; Future    Class 3 severe obesity due to excess calories without serious comorbidity with body mass index (BMI) of 40.0 to 44.9 in adult (CMS/[facility])  -     TSH; Future  -     T4, Free; Future  -     Comprehensive Metabolic Panel; Future  -     Vitamin D 25 Hydroxy; Future  -     Vitamin B12 & Folate; Future      Hypothyroidism  Thyroid levels are stable  Continue levothyroxine 88 mcg oral daily    Vitamin D deficiency  Continue vitamin D replacement    Obesity-worse  Patient has lost weight but she gained all that weight back.  Per her chart she has been the same weight since she has started the medication.  This could be due to stress in school, lack of following her routine and sticking to the dietary plan.  However it appears as if weight loss medications might not be appropriate for the patient if she cannot follow diet and exercise as at that point the risk of the medication outweighs the benefit    Reviewed Lab results with the patient.             Jaky Allen MD  08/22/19    DEMI Abbott / transcription  "disclaimer:     \"Dictated utilizing Dragon dictation\".  "

## 2019-09-11 RX ORDER — CITALOPRAM 20 MG/1
TABLET ORAL
Qty: 30 TABLET | Refills: 10 | OUTPATIENT
Start: 2019-09-11

## 2019-09-23 RX ORDER — LEVOTHYROXINE SODIUM 88 UG/1
88 TABLET ORAL DAILY
Qty: 30 TABLET | Refills: 1 | Status: SHIPPED | OUTPATIENT
Start: 2019-09-23 | End: 2020-02-07

## 2019-09-23 RX ORDER — ERGOCALCIFEROL 1.25 MG/1
50000 CAPSULE ORAL 2 TIMES WEEKLY
Qty: 30 CAPSULE | Refills: 11 | Status: SHIPPED | OUTPATIENT
Start: 2019-09-23

## 2020-02-07 RX ORDER — LEVOTHYROXINE SODIUM 88 UG/1
TABLET ORAL
Qty: 30 TABLET | Refills: 0 | Status: SHIPPED | OUTPATIENT
Start: 2020-02-07 | End: 2020-03-17

## 2020-03-17 RX ORDER — LEVOTHYROXINE SODIUM 88 UG/1
TABLET ORAL
Qty: 30 TABLET | Refills: 0 | Status: SHIPPED | OUTPATIENT
Start: 2020-03-17 | End: 2020-05-06

## 2020-05-06 RX ORDER — LEVOTHYROXINE SODIUM 88 UG/1
TABLET ORAL
Qty: 30 TABLET | Refills: 0 | Status: SHIPPED | OUTPATIENT
Start: 2020-05-06 | End: 2020-06-17

## 2020-06-17 RX ORDER — LEVOTHYROXINE SODIUM 88 UG/1
TABLET ORAL
Qty: 30 TABLET | Refills: 0 | Status: SHIPPED | OUTPATIENT
Start: 2020-06-17 | End: 2020-07-30

## 2020-07-30 RX ORDER — LEVOTHYROXINE SODIUM 88 UG/1
TABLET ORAL
Qty: 30 TABLET | Refills: 0 | Status: SHIPPED | OUTPATIENT
Start: 2020-07-30 | End: 2020-09-11 | Stop reason: SDUPTHER

## 2020-08-04 ENCOUNTER — RESULTS ENCOUNTER (OUTPATIENT)
Dept: ENDOCRINOLOGY | Age: 25
End: 2020-08-04

## 2020-08-04 DIAGNOSIS — E03.9 ACQUIRED HYPOTHYROIDISM: ICD-10-CM

## 2020-08-04 DIAGNOSIS — E55.9 VITAMIN D DEFICIENCY: ICD-10-CM

## 2020-08-04 DIAGNOSIS — E66.01 CLASS 3 SEVERE OBESITY DUE TO EXCESS CALORIES WITHOUT SERIOUS COMORBIDITY WITH BODY MASS INDEX (BMI) OF 40.0 TO 44.9 IN ADULT (HCC): ICD-10-CM

## 2020-08-07 LAB
25(OH)D3+25(OH)D2 SERPL-MCNC: 20.9 NG/ML (ref 30–100)
ALBUMIN SERPL-MCNC: 4.3 G/DL (ref 3.5–5.2)
ALBUMIN/GLOB SERPL: 2 G/DL
ALP SERPL-CCNC: 51 U/L (ref 39–117)
ALT SERPL-CCNC: 17 U/L (ref 1–33)
AST SERPL-CCNC: 12 U/L (ref 1–32)
BILIRUB SERPL-MCNC: 0.2 MG/DL (ref 0–1.2)
BUN SERPL-MCNC: 14 MG/DL (ref 6–20)
BUN/CREAT SERPL: 17.3 (ref 7–25)
CALCIUM SERPL-MCNC: 9.2 MG/DL (ref 8.6–10.5)
CHLORIDE SERPL-SCNC: 104 MMOL/L (ref 98–107)
CO2 SERPL-SCNC: 26.4 MMOL/L (ref 22–29)
CREAT SERPL-MCNC: 0.81 MG/DL (ref 0.57–1)
FOLATE SERPL-MCNC: 7.78 NG/ML (ref 4.78–24.2)
GLOBULIN SER CALC-MCNC: 2.2 GM/DL
GLUCOSE SERPL-MCNC: 93 MG/DL (ref 65–99)
POTASSIUM SERPL-SCNC: 4.8 MMOL/L (ref 3.5–5.2)
PROT SERPL-MCNC: 6.5 G/DL (ref 6–8.5)
SODIUM SERPL-SCNC: 141 MMOL/L (ref 136–145)
T4 FREE SERPL-MCNC: 1.33 NG/DL (ref 0.93–1.7)
TSH SERPL DL<=0.005 MIU/L-ACNC: 3.56 UIU/ML (ref 0.27–4.2)
VIT B12 SERPL-MCNC: 444 PG/ML (ref 211–946)

## 2020-08-12 ENCOUNTER — TELEPHONE (OUTPATIENT)
Dept: ENDOCRINOLOGY | Age: 25
End: 2020-08-12

## 2020-08-12 NOTE — TELEPHONE ENCOUNTER
LEFT VOICE MAIL FOR PATIENT TO IN REGARD TO THEIR APPT WITH DR GREENBERG ON 8/20/20 AT 2:15 PM TO LET THEM KNOW THAT SHE WAS NOT SEEING PT IS OFFICE BUT IS OFFERING A VIDEO OR TELEPHONE VISIT

## 2020-08-17 ENCOUNTER — TELEPHONE (OUTPATIENT)
Dept: ENDOCRINOLOGY | Age: 25
End: 2020-08-17

## 2020-08-20 ENCOUNTER — TELEMEDICINE (OUTPATIENT)
Dept: ENDOCRINOLOGY | Age: 25
End: 2020-08-20

## 2020-08-20 DIAGNOSIS — E03.9 ACQUIRED HYPOTHYROIDISM: Primary | ICD-10-CM

## 2020-08-20 DIAGNOSIS — E55.9 VITAMIN D DEFICIENCY: ICD-10-CM

## 2020-08-20 DIAGNOSIS — E66.01 CLASS 3 SEVERE OBESITY DUE TO EXCESS CALORIES WITHOUT SERIOUS COMORBIDITY WITH BODY MASS INDEX (BMI) OF 40.0 TO 44.9 IN ADULT (HCC): ICD-10-CM

## 2020-08-20 PROCEDURE — 99214 OFFICE O/P EST MOD 30 MIN: CPT | Performed by: INTERNAL MEDICINE

## 2020-08-20 NOTE — PROGRESS NOTES
24 y.o.    Patient Care Team:  Geraldine Craig DO as PCP - General (Internal Medicine)    Chief Complaint:    Follow up/ hypothyroidism  Subjective     HPI    Latisha Joseph 24 y.o. WF presents as a F/u patient for the evaluation of Hypothyroidism. Consulted by Dr. Turk.      Today in visit patient reports that she is on levothyroxine 88 mcg oral daily, compliant with the medication and takes it on empty stomach.  Her energy levels are decent, she has lost about 15 pounds of weight by following the keto diet.  No complaints of constipation or diarrhea.  Sleep is good.  No other significant hyper or hypothyroid symptoms.  Family history of thyroid disease in her aunt.  Menarche at age 11 -12, periods are regular for most part, on birth control pills, last menstrual cycle was July 2019. No miscarriages and never been pregnant.      Obesity  Patient was not dysemia in the past, she has lost some weight but when she stopped the medication she gained the weight back.  Currently she is following the keto diet and as a result has lost about 15 pounds of weight.      You have chosen to receive care through a video visit today. Do you consent to use a video visit for your medical care today? Yes      Reviewed primary care physician's/consulting physician documentation and lab results :     Interval History      The following portions of the patient's history were reviewed and updated as appropriate: allergies, current medications, past family history, past medical history, past social history, past surgical history and problem list.    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 [employer]     Comment: [employer]    Tobacco Use   • Smoking status: Never Smoker   • Smokeless tobacco: Never Used   Substance and Sexual Activity   • Drug use: No   • Sexual activity: Never     No Known Allergies    Current Outpatient Medications:   •  levothyroxine (SYNTHROID, LEVOTHROID) 88 MCG tablet, TAKE ONE TABLET BY MOUTH DAILY, Disp: 30 tablet, Rfl: 0  •  LOW-OGESTREL 0.3-30 MG-MCG per tablet, Take 1 tablet by mouth Daily., Disp: 28 tablet, Rfl: 11  •  vitamin D (ERGOCALCIFEROL) 99224 units capsule capsule, Take 1 capsule by mouth 2 (Two) Times a Week., Disp: 30 capsule, Rfl: 11  •  citalopram (CeleXA) 20 MG tablet, Take 1 tablet by mouth Daily., Disp: 30 tablet, Rfl: 11  •  Evening Primrose Oil 500 MG capsule, Take 500 mg by mouth Daily., Disp: 30 each, Rfl: 11        Review of Systems   Constitutional: Negative for appetite change, fatigue and fever.   Eyes: Negative for visual disturbance.   Respiratory: Negative for shortness of breath.    Cardiovascular: Negative for palpitations and leg swelling.   Gastrointestinal: Negative for abdominal pain and vomiting.   Endocrine: Negative for polydipsia and polyuria.   Musculoskeletal: Negative for joint swelling and neck pain.   Skin: Negative for rash.   Neurological: Negative for weakness and numbness.   Psychiatric/Behavioral: Negative for behavioral problems.     I have reviewed the ROS as documented by the MA; Jaky Allen MD.      Objective       There were no vitals filed for this visit.  There is no height or weight on file to calculate BMI.      Physical Exam  General appearance - no distress  Eyes-PERRLA, anicteric sclera  Ear nose and throat-external ears and nose normal.  Noted midline trachea.  Respiratory-normal chest on inspection.  No respiratory distress noted.  Musculoskeletal-no edema.  Hammer toes  noted.  Skin-no rashes.  Neuro-alert and oriented x3              Results Review:     I reviewed the patient's new clinical results and mentioned them above in HPI and in plan as well.    Medical records reviewed  Summary:Done      Results Encounter on 08/04/2020   Component Date Value Ref Range Status   • TSH 08/06/2020 3.560  0.270 - 4.200 uIU/mL Final   • Free T4 08/06/2020 1.33  0.93 - 1.70 ng/dL Final    Results may be falsely increased if patient taking Biotin.   • Glucose 08/06/2020 93  65 - 99 mg/dL Final   • BUN 08/06/2020 14  6 - 20 mg/dL Final   • Creatinine 08/06/2020 0.81  0.57 - 1.00 mg/dL Final   • eGFR Non  Am 08/06/2020 87  >60 mL/min/1.73 Final   • eGFR African Am 08/06/2020 105  >60 mL/min/1.73 Final   • BUN/Creatinine Ratio 08/06/2020 17.3  7.0 - 25.0 Final   • Sodium 08/06/2020 141  136 - 145 mmol/L Final   • Potassium 08/06/2020 4.8  3.5 - 5.2 mmol/L Final   • Chloride 08/06/2020 104  98 - 107 mmol/L Final   • Total CO2 08/06/2020 26.4  22.0 - 29.0 mmol/L Final   • Calcium 08/06/2020 9.2  8.6 - 10.5 mg/dL Final   • Total Protein 08/06/2020 6.5  6.0 - 8.5 g/dL Final   • Albumin 08/06/2020 4.30  3.50 - 5.20 g/dL Final   • Globulin 08/06/2020 2.2  gm/dL Final   • A/G Ratio 08/06/2020 2.0  g/dL Final   • Total Bilirubin 08/06/2020 0.2  0.0 - 1.2 mg/dL Final   • Alkaline Phosphatase 08/06/2020 51  39 - 117 U/L Final   • AST (SGOT) 08/06/2020 12  1 - 32 U/L Final   • ALT (SGPT) 08/06/2020 17  1 - 33 U/L Final   • 25 Hydroxy, Vitamin D 08/06/2020 20.9* 30.0 - 100.0 ng/ml Final    Comment: Results may be falsely increased if patient taking Biotin.  Reference Range for Total Vitamin D 25(OH)  Deficiency <20.0 ng/mL  Insufficiency 21-29 ng/mL  Sufficiency  ng/mL  Toxicity >100 ng/ml     • Vitamin B-12 08/06/2020 444  211 - 946 pg/mL Final    Results may be falsely increased if patient taking Biotin.   • Folate 08/06/2020 7.78  4.78 - 24.20 ng/mL Final    Results may be falsely increased if  "patient taking Biotin.     Lab Results   Component Value Date    HGBA1C 5.60 09/01/2017    HGBA1C 5.5 08/12/2016     Lab Results   Component Value Date    CREATININE 0.81 08/06/2020     Imaging Results (Most Recent)     None                Assessment and Plan:    Latisha was seen today for hypothyroidism.    Diagnoses and all orders for this visit:    Acquired hypothyroidism  -     Creatinine, Serum; Future  -     eGFR-Glomerular Filtration; Future  -     Vitamin B12 & Folate; Future  -     TSH; Future  -     Microalbumin / Creatinine Urine Ratio - Urine, Clean Catch; Future  -     Lipid Panel; Future  -     T4, Free; Future    Vitamin D deficiency  -     Creatinine, Serum; Future  -     eGFR-Glomerular Filtration; Future  -     Vitamin B12 & Folate; Future  -     TSH; Future  -     Microalbumin / Creatinine Urine Ratio - Urine, Clean Catch; Future  -     Lipid Panel; Future  -     T4, Free; Future    Class 3 severe obesity due to excess calories without serious comorbidity with body mass index (BMI) of 40.0 to 44.9 in adult (CMS/[facility])  -     Creatinine, Serum; Future  -     eGFR-Glomerular Filtration; Future  -     Vitamin B12 & Folate; Future  -     TSH; Future  -     Microalbumin / Creatinine Urine Ratio - Urine, Clean Catch; Future  -     Lipid Panel; Future  -     T4, Free; Future      Hypothyroidism  Continue levothyroxine 88 mcg oral daily    Vitamin D deficiency-worse  Emphasized the patient to be compliant with her vitamin D replacement    Obesity  Advised the patient to follow intermittent fasting    Reviewed Lab results with the patient.             13   ( greater than 50% of the time) out of  25 minutes  spent counseling the patient on medication changes, blood work-up that is necessary follow-up instructions.    Jaky Allen MD  08/20/20    EMR Dragon / transcription disclaimer:     \"Dictated utilizing Dragon dictation\".      "

## 2020-09-14 RX ORDER — LEVOTHYROXINE SODIUM 88 UG/1
88 TABLET ORAL DAILY
Qty: 30 TABLET | Refills: 6 | Status: SHIPPED | OUTPATIENT
Start: 2020-09-14 | End: 2021-05-13

## 2021-03-18 ENCOUNTER — OFFICE VISIT (OUTPATIENT)
Dept: OBSTETRICS AND GYNECOLOGY | Age: 26
End: 2021-03-18

## 2021-03-18 VITALS
SYSTOLIC BLOOD PRESSURE: 126 MMHG | BODY MASS INDEX: 41.52 KG/M2 | HEIGHT: 68 IN | DIASTOLIC BLOOD PRESSURE: 84 MMHG | WEIGHT: 274 LBS

## 2021-03-18 DIAGNOSIS — Z12.4 SCREENING FOR CERVICAL CANCER: ICD-10-CM

## 2021-03-18 DIAGNOSIS — Z01.419 WELL WOMAN EXAM WITH ROUTINE GYNECOLOGICAL EXAM: Primary | ICD-10-CM

## 2021-03-18 DIAGNOSIS — E66.01 CLASS 3 SEVERE OBESITY DUE TO EXCESS CALORIES WITHOUT SERIOUS COMORBIDITY IN ADULT, UNSPECIFIED BMI (HCC): ICD-10-CM

## 2021-03-18 PROCEDURE — 99395 PREV VISIT EST AGE 18-39: CPT | Performed by: NURSE PRACTITIONER

## 2021-03-18 RX ORDER — NORGESTREL AND ETHINYL ESTRADIOL 0.3-0.03MG
1 KIT ORAL DAILY
Qty: 28 TABLET | Refills: 11 | Status: SHIPPED | OUTPATIENT
Start: 2021-03-18

## 2021-03-18 NOTE — PROGRESS NOTES
Subjective     Chief Complaint   Patient presents with   • Gynecologic Exam     Last pap2019 normal       History of Present Illness    Latisha Ruiz is a 25 y.o.  who presents for annual exam.    Pt doing well   Graduates from [employer] in 4 days.   Planning on working at [employer]  In monogamous relations but not SA  Declines STD testing today  PCP is Dr. Lisa Stewart   No longer seeing endocrine as her thyroid has been stable so this is being managed by her PCP now.  She is taking OCP's for cycle control and she is happy with this  Her menses are regular every 28-30 days, lasting 4-7 days, dysmenorrhea none   Pt of Dr. Turk    Obstetric History:  OB History        0    Para   0    Term   0       0    AB   0    Living   0       SAB   0    TAB   0    Ectopic   0    Molar        Multiple   0    Live Births                   Menstrual History:     Patient's last menstrual period was 2021.         Current contraception: OCP (estrogen/progesterone)  History of abnormal Pap smear: no  Received Gardasil immunization: yes  Perform regular self breast exam: yes - regularly   Family history of uterine or ovarian cancer: no  Family History of colon cancer: yes - Great uncle    Family history of breast cancer: no    Mammogram: not indicated.  Colonoscopy: not indicated.  DEXA: not indicated.    Exercise: moderately active  Calcium/Vitamin D: adequate intake    The following portions of the patient's history were reviewed and updated as appropriate: allergies, current medications, past family history, past medical history, past social history, past surgical history and problem list.    Review of Systems   Constitutional: Negative.    Respiratory: Negative.    Cardiovascular: Negative.    Gastrointestinal: Negative.    Genitourinary: Negative.    Skin: Negative.    Psychiatric/Behavioral: Negative.            Objective   Physical Exam  Constitutional:       General: She is awake.       Appearance: Normal appearance. She is well-developed. She is obese.   HENT:      Head: Normocephalic and atraumatic.      Nose: Nose normal.   Neck:      Thyroid: No thyroid mass, thyromegaly or thyroid tenderness.   Cardiovascular:      Rate and Rhythm: Normal rate and regular rhythm.      Pulses: Normal pulses.      Heart sounds: Normal heart sounds.   Pulmonary:      Effort: Pulmonary effort is normal.      Breath sounds: Normal breath sounds.   Chest:      Breasts: Breasts are symmetrical.         Right: Normal. No swelling, bleeding, inverted nipple, mass, nipple discharge, skin change or tenderness.         Left: Normal. No swelling, bleeding, inverted nipple, mass, nipple discharge, skin change or tenderness.   Abdominal:      General: Abdomen is flat. Bowel sounds are normal.      Palpations: Abdomen is soft.      Tenderness: There is no abdominal tenderness.   Genitourinary:     General: Normal vulva.      Labia:         Right: No rash, tenderness, lesion or injury.         Left: No rash, tenderness, lesion or injury.       Urethra: No prolapse, urethral pain, urethral swelling or urethral lesion.      Vagina: Normal. No signs of injury. No vaginal discharge, erythema, tenderness, bleeding, lesions or prolapsed vaginal walls.      Cervix: No discharge, friability, lesion, erythema or cervical bleeding.      Uterus: Normal. Not enlarged, not tender and no uterine prolapse.       Adnexa: Right adnexa normal and left adnexa normal.        Right: No mass, tenderness or fullness.          Left: No mass, tenderness or fullness.        Rectum: Normal. No mass.   Musculoskeletal:      Cervical back: Normal range of motion and neck supple.   Lymphadenopathy:      Upper Body:      Right upper body: No supraclavicular adenopathy.      Left upper body: No supraclavicular adenopathy.   Skin:     General: Skin is warm and dry.   Neurological:      General: No focal deficit present.      Mental Status: She is alert and  "oriented to person, place, and time.   Psychiatric:         Mood and Affect: Mood normal.         Behavior: Behavior normal. Behavior is cooperative.         Thought Content: Thought content normal.         Judgment: Judgment normal.         /84   Ht 172.7 cm (68\")   Wt 124 kg (274 lb)   LMP 02/22/2021   BMI 41.66 kg/m²     Assessment/Plan   Diagnoses and all orders for this visit:    1. Well woman exam with routine gynecological exam (Primary)  -     IGP, Rfx Aptima HPV ASCU    2. Screening for cervical cancer  -     IGP, Rfx Aptima HPV ASCU    3. Class 3 severe obesity due to excess calories without serious comorbidity in adult, unspecified BMI (CMS/HCC)    Other orders  -     Low-Ogestrel 0.3-30 MG-MCG per tablet; Take 1 tablet by mouth Daily.  Dispense: 28 tablet; Refill: 11        All questions answered.  Breast self exam technique reviewed and patient encouraged to perform self-exam monthly.  Discussed healthy lifestyle modifications.  Recommended 30 minutes of aerobic exercise five times per week.  Discussed calcium and vitamin D needs to prevent osteoporosis.    -Pap smear updated today  -She declines STD testing  -OCP's refilled  -Continue to follow with PCP for HM and thyroid management  -F/u yearly, sooner prn                "

## 2021-03-22 LAB
CONV .: NORMAL
CYTOLOGIST CVX/VAG CYTO: NORMAL
CYTOLOGY CVX/VAG DOC CYTO: NORMAL
CYTOLOGY CVX/VAG DOC THIN PREP: NORMAL
DX ICD CODE: NORMAL
HIV 1 & 2 AB SER-IMP: NORMAL
OTHER STN SPEC: NORMAL
STAT OF ADQ CVX/VAG CYTO-IMP: NORMAL

## 2021-04-16 ENCOUNTER — BULK ORDERING (OUTPATIENT)
Dept: CASE MANAGEMENT | Facility: OTHER | Age: 26
End: 2021-04-16

## 2021-04-16 DIAGNOSIS — Z23 IMMUNIZATION DUE: ICD-10-CM

## 2021-05-13 RX ORDER — LEVOTHYROXINE SODIUM 88 UG/1
TABLET ORAL
Qty: 30 TABLET | Refills: 5 | Status: SHIPPED | OUTPATIENT
Start: 2021-05-13 | End: 2022-05-02 | Stop reason: SDUPTHER

## 2022-04-20 ENCOUNTER — TELEPHONE (OUTPATIENT)
Dept: ENDOCRINOLOGY | Age: 27
End: 2022-04-20

## 2022-05-02 RX ORDER — LEVOTHYROXINE SODIUM 88 UG/1
88 TABLET ORAL DAILY
Qty: 90 TABLET | Refills: 0 | Status: SHIPPED | OUTPATIENT
Start: 2022-05-02

## 2023-06-14 RX ORDER — CITALOPRAM 20 MG/1
TABLET ORAL
Qty: 30 TABLET | Refills: 0 | Status: SHIPPED | OUTPATIENT
Start: 2023-06-14